# Patient Record
Sex: FEMALE | Race: WHITE | Employment: OTHER | URBAN - METROPOLITAN AREA
[De-identification: names, ages, dates, MRNs, and addresses within clinical notes are randomized per-mention and may not be internally consistent; named-entity substitution may affect disease eponyms.]

---

## 2020-07-29 ENCOUNTER — HOSPITAL ENCOUNTER (OUTPATIENT)
Dept: PHYSICAL THERAPY | Facility: CLINIC | Age: 65
Setting detail: THERAPIES SERIES
End: 2020-07-29
Attending: FAMILY MEDICINE
Payer: MEDICARE

## 2020-07-29 PROCEDURE — 97116 GAIT TRAINING THERAPY: CPT | Mod: GP | Performed by: PHYSICAL THERAPIST

## 2020-07-29 PROCEDURE — 97112 NEUROMUSCULAR REEDUCATION: CPT | Mod: GP | Performed by: PHYSICAL THERAPIST

## 2020-07-29 PROCEDURE — 97161 PT EVAL LOW COMPLEX 20 MIN: CPT | Mod: GP | Performed by: PHYSICAL THERAPIST

## 2020-07-29 NOTE — PROGRESS NOTES
[facility]        OUTPATIENT PHYSICAL THERAPY FUNCTIONAL EVALUATION  PLAN OF TREATMENT FOR OUTPATIENT REHABILITATION  (COMPLETE FOR INITIAL CLAIMS ONLY)  Patient's Last Name, First Name, M.I.  YOB: 1955  Maru Mock     Provider's Name   [facility]   Medical Record No.  [id number]     Start of Care Date:  07/29/20   Onset Date:  05/15/20   Type:     _X__PT   ____OT  ____SLP Medical Diagnosis:  Left leg weakness     PT Diagnosis:  LE weakness, impaired balance, impaired gait Visits from SOC:  1                              __________________________________________________________________________________  Plan of Treatment/Functional Goals:  balance training, neuromuscular re-education, ROM, strengthening, stretching, transfer training           GOALS  Walking outside on uneven surfaces  Maru will tolerate walking 1 mile outside with use of walking poles for aerobica activity on uneven surface of grass at her cabin.  10/27/20    Car transfer  Maru will demonstrate safe sequence and then ability to perform a car transfer in her 's SUV without physical assist of 1.  10/27/20    Falls  Maru will report no falls in a one month time frame and verbalize 3 strategies for mobility to reduce her risk.  10/27/20                                                           Therapy Frequency:  1 time/week   Predicted Duration of Therapy Intervention:  90 days    Khadijah Dunlap PT                                    I CERTIFY THE NEED FOR THESE SERVICES FURNISHED UNDER        THIS PLAN OF TREATMENT AND WHILE UNDER MY CARE .             Physician Signature               Date    X_____________________________________________________                      Certification Date From:  07/29/20   Certification Date To:  10/27/20    Referring Provider:  Mary Talbot,  MD    Initial Assessment  See Epic Evaluation- Start of Care Date: 07/29/20

## 2020-07-29 NOTE — PROGRESS NOTES
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   Patient role/Employment history Retired   Living environment House/townhome   Home/Community Accessibility Comments 2 story with , stairs with definite use of railing, 26 year old son lives with them   Current Assistive Devices Front Wheeled Walker;Standard Cane   Fall Risk Screen   Fall screen completed by PT   Have you fallen 2 or more times in the past year? Yes   Have you fallen and had an injury in the past year? Yes   Timed Up and Go score (seconds) 11.8   Is patient a fall risk? Yes   Pain   Patient currently in pain Denies   Vitals Signs   Vital Signs Comments Monitors BP at home with it running a little high   Cognitive Status Examination   Orientation orientation to person, place and time   Level of Consciousness alert   Follows Commands and Answers Questions 100% of the time;able to follow multistep instructions   Personal Safety and Judgment intact   Posture   Posture Normal   Posture Comments Looks down at her feet during gait   Range of Motion (ROM)   ROM Comment Left shoulder flexion to 160   Strength   Strength Comments Left hip flexion=4+/5, left knee flexion/extension=4/5, left ankle DF=4/5   Bed Mobility   Bed Mobility Comments Independent   Transfer Skills   Transfer Comments UE used  for safety   Gait   Gait Comments Gait with wider base and short step length with limited push off through toes.   Gait Special Tests   Gait Special Tests 25 FOOT TIMED WALK;FUNCTIONAL GAIT ASSESSMENT   Gait Special Tests 25 Foot Timed Walk   Seconds 8.35   Steps 16 Steps   Gait Special Tests Functional Gait Assessment Score out of 30   Score out of 30 20   Balance   Balance other (describe)   Balance Special Tests   Balance Special Tests Sit to stand reps   Balance Special Tests Sit to Stand Reps in 30 Seconds   Reps in 30 seconds 10   Height 18   Sensory Examination   Sensory Perception no deficits were identified   Sensory Perception Comments Reports full recovery   Planned Therapy Interventions   Planned Therapy Interventions balance training;neuromuscular re-education;ROM;strengthening;stretching;transfer training   Clinical Impression   Criteria for Skilled Therapeutic Interventions Met yes, treatment indicated   PT Diagnosis LE weakness, impaired balance, impaired gait   Influenced by the following impairments LE weakness, impaired balance, fear of falling, guarded posture   Functional limitations due to impairments Requires UE assist when walking outside, requires assistance getting into 's SUV, unable to carry laundry on stairs   Clinical Presentation Stable/Uncomplicated   Clinical Presentation Rationale Clinical judgement, symptom report   Clinical Decision Making (Complexity) Low complexity   Therapy Frequency 1 time/week   Predicted Duration of Therapy Intervention (days/wks) 90 days   Risk & Benefits of therapy have been explained Yes   Patient, Family & other staff in agreement with plan of care Yes   Clinical Impression Comments Maru presents with residual left sided weakness from previous CVA, right hip weakness s/p pinning and bilateral foot surgery with pinning through 1st toes impacting balance and mobility.  She will benefit from skilled PT to address stregnthening, balance and safety  set up to reduce risk for falls.   GOALS   PT Eval Goals 1;2;3   Goal 1   Goal Identifier Walking outside on uneven surfaces   Goal Description Maru will tolerate walking 1 mile outside with use of walking poles for aerobica activity on uneven surface of grass at her cabin.   Target Date 10/27/20   Goal 2   Goal Identifier Car transfer   Goal Description Maru will demonstrate safe sequence and then ability to perform a car transfer in her 's SUV without physical assist of 1.   Target Date 10/27/20   Goal 3   Goal Identifier Falls   Goal Description Maru will report no falls in a one month time frame and verbalize 3 strategies for mobility to reduce her risk.   Target Date 10/27/20   Total Evaluation Time   PT Eval, Low Complexity Minutes (98853) 30   Therapy Certification   Certification date from 07/29/20   Certification date to 10/27/20   Medical Diagnosis Left leg weakness   Certification I certify the need for these services furnished under this plan of treatment and while under my care.  (Physician co-signature of this document indicates review and certification of the therapy plan).

## 2020-08-05 ENCOUNTER — HOSPITAL ENCOUNTER (OUTPATIENT)
Dept: PHYSICAL THERAPY | Facility: CLINIC | Age: 65
Setting detail: THERAPIES SERIES
End: 2020-08-05
Attending: FAMILY MEDICINE
Payer: MEDICARE

## 2020-08-05 PROCEDURE — 97112 NEUROMUSCULAR REEDUCATION: CPT | Mod: GP | Performed by: PHYSICAL THERAPIST

## 2020-08-05 PROCEDURE — 97110 THERAPEUTIC EXERCISES: CPT | Mod: GP | Performed by: PHYSICAL THERAPIST

## 2020-08-05 PROCEDURE — 97116 GAIT TRAINING THERAPY: CPT | Mod: GP | Performed by: PHYSICAL THERAPIST

## 2020-08-13 ENCOUNTER — HOSPITAL ENCOUNTER (OUTPATIENT)
Dept: PHYSICAL THERAPY | Facility: CLINIC | Age: 65
Setting detail: THERAPIES SERIES
End: 2020-08-13
Attending: FAMILY MEDICINE
Payer: MEDICARE

## 2020-08-13 PROCEDURE — 97110 THERAPEUTIC EXERCISES: CPT | Mod: GP | Performed by: PHYSICAL THERAPIST

## 2020-08-13 PROCEDURE — 97140 MANUAL THERAPY 1/> REGIONS: CPT | Mod: GP | Performed by: PHYSICAL THERAPIST

## 2020-08-13 PROCEDURE — 97116 GAIT TRAINING THERAPY: CPT | Mod: GP | Performed by: PHYSICAL THERAPIST

## 2020-08-19 ENCOUNTER — HOSPITAL ENCOUNTER (OUTPATIENT)
Dept: PHYSICAL THERAPY | Facility: CLINIC | Age: 65
Setting detail: THERAPIES SERIES
End: 2020-08-19
Attending: FAMILY MEDICINE
Payer: MEDICARE

## 2020-08-19 PROCEDURE — 97116 GAIT TRAINING THERAPY: CPT | Mod: GP | Performed by: PHYSICAL THERAPIST

## 2020-08-19 PROCEDURE — 97140 MANUAL THERAPY 1/> REGIONS: CPT | Mod: GP | Performed by: PHYSICAL THERAPIST

## 2020-08-19 PROCEDURE — 97110 THERAPEUTIC EXERCISES: CPT | Mod: GP | Performed by: PHYSICAL THERAPIST

## 2020-08-26 ENCOUNTER — HOSPITAL ENCOUNTER (OUTPATIENT)
Dept: PHYSICAL THERAPY | Facility: CLINIC | Age: 65
Setting detail: THERAPIES SERIES
End: 2020-08-26
Attending: FAMILY MEDICINE
Payer: MEDICARE

## 2020-08-26 PROCEDURE — 97116 GAIT TRAINING THERAPY: CPT | Mod: GP | Performed by: PHYSICAL THERAPIST

## 2020-08-26 PROCEDURE — 97110 THERAPEUTIC EXERCISES: CPT | Mod: GP | Performed by: PHYSICAL THERAPIST

## 2020-09-02 ENCOUNTER — HOSPITAL ENCOUNTER (OUTPATIENT)
Dept: PHYSICAL THERAPY | Facility: CLINIC | Age: 65
Setting detail: THERAPIES SERIES
End: 2020-09-02
Attending: FAMILY MEDICINE
Payer: MEDICARE

## 2020-09-02 PROCEDURE — 97116 GAIT TRAINING THERAPY: CPT | Mod: GP | Performed by: PHYSICAL THERAPIST

## 2020-09-02 PROCEDURE — 97110 THERAPEUTIC EXERCISES: CPT | Mod: GP | Performed by: PHYSICAL THERAPIST

## 2020-09-09 ENCOUNTER — HOSPITAL ENCOUNTER (OUTPATIENT)
Dept: PHYSICAL THERAPY | Facility: CLINIC | Age: 65
Setting detail: THERAPIES SERIES
End: 2020-09-09
Attending: FAMILY MEDICINE
Payer: MEDICARE

## 2020-09-09 PROCEDURE — 97112 NEUROMUSCULAR REEDUCATION: CPT | Mod: GP | Performed by: PHYSICAL THERAPIST

## 2020-09-09 PROCEDURE — 97116 GAIT TRAINING THERAPY: CPT | Mod: GP | Performed by: PHYSICAL THERAPIST

## 2020-09-09 PROCEDURE — 97110 THERAPEUTIC EXERCISES: CPT | Mod: GP | Performed by: PHYSICAL THERAPIST

## 2020-09-30 ENCOUNTER — HOSPITAL ENCOUNTER (OUTPATIENT)
Dept: PHYSICAL THERAPY | Facility: CLINIC | Age: 65
Setting detail: THERAPIES SERIES
End: 2020-09-30
Attending: FAMILY MEDICINE
Payer: MEDICARE

## 2020-09-30 PROCEDURE — 97112 NEUROMUSCULAR REEDUCATION: CPT | Mod: GP | Performed by: PHYSICAL THERAPIST

## 2020-09-30 PROCEDURE — 97116 GAIT TRAINING THERAPY: CPT | Mod: GP | Performed by: PHYSICAL THERAPIST

## 2020-11-03 ENCOUNTER — HOSPITAL ENCOUNTER (OUTPATIENT)
Dept: PHYSICAL THERAPY | Facility: CLINIC | Age: 65
Setting detail: THERAPIES SERIES
End: 2020-11-03
Attending: FAMILY MEDICINE
Payer: MEDICARE

## 2020-11-03 PROCEDURE — 97116 GAIT TRAINING THERAPY: CPT | Mod: GP | Performed by: PHYSICAL THERAPIST

## 2020-11-03 PROCEDURE — 97112 NEUROMUSCULAR REEDUCATION: CPT | Mod: GP | Performed by: PHYSICAL THERAPIST

## 2020-11-06 NOTE — PROGRESS NOTES
[facility]      OUTPATIENT PHYSICAL THERAPY  PLAN OF TREATMENT FOR OUTPATIENT REHABILITATION    Patient's Last Name, First Name, M.I.                YOB: 1955  Maru Mock                        Provider's Name  [facility] Medical Record No.  [id number]                               Onset Date: 5/15/2020   Start of Care Date: 7/29/2020   Type:     _X_PT   ___OT   ___SLP Medical Diagnosis: Left leg weakness                       PT Diagnosis: LE weakness, impaired balance, impaired gait      _________________________________________________________________________________  Plan of Treatment:    Frequency/Duration: 1x every 2 weeks for 90 days     Goals:  Goal Identifier Walking outside on uneven surfaces   Goal Description Maru will tolerate walking 1 mile outside with use of walking poles for aerobica activity on uneven surface of grass at her cabin.   Target Date 11/27/20   Date Met      Progress:This goal was extended due to not walking outside for the past month due to increased fatigue.       Goal Identifier Car transfer   Goal Description Maru will demonstrate safe sequence and then ability to perform a car transfer in her 's SUV without physical assist of 1.   Target Date 10/27/20   Date Met  11/03/20   Progress:She reports this is easier and feels it is safer sequence.     Goal Identifier Falls   Goal Description Maru will report no falls in a one month time frame and verbalize 3 strategies for mobility to reduce her risk.   Target Date 10/27/20   Date Met  11/03/20   Progress:Reprots no falls with ability to verbalize 3 strategies.     Goal Identifier NEW GOAL- vaccuum   Goal Description Maru will be able to perform houhold tasks, such as vaccuuming, for 30 minutes reporting tolerable fatigue and no loss of balance.   Target Date 01/25/21   Date Met       Progress:         Certification date from 10/28/2020 to 1/25/2021.    Khadijah Dunlap PT          I CERTIFY THE NEED FOR THESE SERVICES FURNISHED UNDER        THIS PLAN OF TREATMENT AND WHILE UNDER MY CARE .             Physician Signature               Date    X_____________________________________________________                      Referring Provider: Mary Talbot MD

## 2020-11-06 NOTE — PROGRESS NOTES
Outpatient Physical Therapy Progress Note     Patient: Maru CASTELLANOS Emili  : 1955    Beginning/End Dates of Reporting Period:  2020 to 2020 Seen for 9 visits    Referring Provider: Mary Talbot MD    Therapy Diagnosis: LE weakness, impaired balance, impaired gait     Client Self Report: Her leg cramps have been bad lately.  She reports motivation for mobility has been low with more fatigue, but reports no falls.    Objective Measurements:  Objective Measure: Sit<>stand  Details: 11 reps in 30 seconds  Objective Measure: 25 Foot Timed Walk  Details: 15 steps in 8.13  Objective Measure: TUG  Details: 12.28    Goals:  Goal Identifier Walking outside on uneven surfaces   Goal Description Maru will tolerate walking 1 mile outside with use of walking poles for aerobica activity on uneven surface of grass at her cabin.   Target Date 20   Date Met      Progress:This goal was extended due to not walking outside for the past month due to increased fatigue.       Goal Identifier Car transfer   Goal Description Maru will demonstrate safe sequence and then ability to perform a car transfer in her 's SUV without physical assist of 1.   Target Date 10/27/20   Date Met  20   Progress:She reports this is easier and feels it is safer sequence.     Goal Identifier Falls   Goal Description Maru will report no falls in a one month time frame and verbalize 3 strategies for mobility to reduce her risk.   Target Date 10/27/20   Date Met  20   Progress:Reprots no falls with ability to verbalize 3 strategies.     Goal Identifier NEW GOAL- vaccuum   Goal Description Maru will be able to perform houhold tasks, such as vaccuuming, for 30 minutes reporting tolerable fatigue and no loss of balance.   Target Date 21   Date Met      Progress:     Progress Toward Goals:   Progress this reporting period: Good progress despite reporting she has been less active over the past month due to  increased fatigue from depressive episode.  She is motivated to reinstate her HEP and begin walking for aerobic activity.  She will benefit from ongoing skilled PT to continue to improve balance and walking mechanics.    Plan:  Continue therapy per current plan of care.    Discharge:  No

## 2020-11-18 ENCOUNTER — HOSPITAL ENCOUNTER (OUTPATIENT)
Dept: PHYSICAL THERAPY | Facility: CLINIC | Age: 65
Setting detail: THERAPIES SERIES
End: 2020-11-18
Attending: FAMILY MEDICINE
Payer: MEDICARE

## 2020-11-18 PROCEDURE — 97116 GAIT TRAINING THERAPY: CPT | Mod: GP | Performed by: PHYSICAL THERAPIST

## 2020-11-18 PROCEDURE — 97112 NEUROMUSCULAR REEDUCATION: CPT | Mod: GP | Performed by: PHYSICAL THERAPIST

## 2020-12-15 ENCOUNTER — HOSPITAL ENCOUNTER (OUTPATIENT)
Dept: PHYSICAL THERAPY | Facility: CLINIC | Age: 65
Setting detail: THERAPIES SERIES
End: 2020-12-15
Attending: FAMILY MEDICINE
Payer: MEDICARE

## 2020-12-15 PROCEDURE — 97116 GAIT TRAINING THERAPY: CPT | Mod: GP | Performed by: PHYSICAL THERAPIST

## 2020-12-15 PROCEDURE — 97112 NEUROMUSCULAR REEDUCATION: CPT | Mod: GP | Performed by: PHYSICAL THERAPIST

## 2021-01-19 ENCOUNTER — HOSPITAL ENCOUNTER (OUTPATIENT)
Dept: PHYSICAL THERAPY | Facility: CLINIC | Age: 66
Setting detail: THERAPIES SERIES
End: 2021-01-19
Attending: FAMILY MEDICINE
Payer: MEDICARE

## 2021-01-19 PROCEDURE — 97112 NEUROMUSCULAR REEDUCATION: CPT | Mod: GP | Performed by: PHYSICAL THERAPIST

## 2021-01-19 PROCEDURE — 97116 GAIT TRAINING THERAPY: CPT | Mod: GP | Performed by: PHYSICAL THERAPIST

## 2021-01-19 NOTE — PROGRESS NOTES
[facility]      OUTPATIENT PHYSICAL THERAPY  PLAN OF TREATMENT FOR OUTPATIENT REHABILITATION    Patient's Last Name, First Name, M.I.                YOB: 1955  Maru Mock                        Provider's Name  [facility] Medical Record No.  [id number]                               Onset Date: 5/15/20   Start of Care Date: 7/29/2020   Type:     _X_PT   ___OT   ___SLP Medical Diagnosis: Left Leg Weakness                       PT Diagnosis:  LE weakness, impaired balance, impaired gait      _________________________________________________________________________________  Plan of Treatment:    Frequency/Duration: 1x month for 90 days    Objective Measurements:  Objective Measure: Sit<>stand  Details: 11 reps in 30 seconds  Objective Measure: 25 Foot Timed Walk  Details: 15 steps in 8.13  Objective Measure: TUG  Details: 12.28     Goals:  Goal Identifier Walking outside on uneven surfaces   Goal Description Maru will tolerate walking 1 mile outside with use of walking poles for aerobica activity on uneven surface of grass at her cabin.   Target Date 11/27/20   Date Met      Progress:This goal was extended due to not walking outside for the past month due to increased fatigue.        Goal Identifier Car transfer   Goal Description Maru will demonstrate safe sequence and then ability to perform a car transfer in her 's SUV without physical assist of 1.   Target Date 10/27/20   Date Met  11/03/20   Progress:She reports this is easier and feels it is safer sequence.      Goal Identifier Falls   Goal Description Maru will report no falls in a one month time frame and verbalize 3 strategies for mobility to reduce her risk.   Target Date 10/27/20   Date Met  11/03/20   Progress:Reprots no falls with ability to verbalize 3 strategies.      Goal Identifier NEW GOAL-  vaccuum   Goal Description Maru will be able to perform houhold tasks, such as vaccuuming, for 30 minutes reporting tolerable fatigue and no loss of balance.   Target Date 01/25/21   Date Met      Progress:      Progress Toward Goals:   Progress this reporting period: Good progress despite reporting she has been less active over the past month due to increased fatigue from depressive episode.  She is motivated to reinstate her HEP and begin walking for aerobic activity.  She will benefit from ongoing skilled PT to continue to improve balance and walking mechanics.     Physician Signature                                     Date     X_____________________________________________________                                 Certification date from 1/26/2021 to 4/25/2021.    Khadijah Dunlap, PT          I CERTIFY THE NEED FOR THESE SERVICES FURNISHED UNDER        THIS PLAN OF TREATMENT AND WHILE UNDER MY CARE     (Physician co-signature of this document indicates review and certification of the therapy plan).                Referring Provider: Mary Talbot MD

## 2021-01-19 NOTE — PROGRESS NOTES
Outpatient Physical Therapy Progress Note     Patient: Maru CASTELLANOS Emili  : 1955    Beginning/End Dates of Reporting Period:  2020 to 2021    Referring Provider: Mary Talbot DO    Therapy Diagnosis:  LE weakness, impaired balance, impaired gait          Client Self Report: Cramping is not as bad and remains intermittent.  No falls and has been doing the exs consistently, but is not walking yet at a community center. She has not vaccuumed as of late, but says it is going better.  Continuing to have a hard time gettign on and off the floor, but will do it every once in a while.  She is not longer climbing on a step stool for safety.    Objective Measurements:  Objective Measure: 25 Foot Timed Walk  Details: 14 steps in 7.6 seconds  Objective Measure: TUG  Details: 11.6 seconds          Goals:  Goal Identifier Walking outside on uneven surfaces   Goal Description Maru will tolerate walking 1 mile outside with use of walking poles for aerobica activity on uneven surface of grass at her cabin.   Target Date 20   Date Met      Progress: She has not been walking outside due to ice.  She is working on walking at nearby [place] [facility].  She has been walking more at stores but uses a cart.     Goal Identifier Car transfer   Goal Description Maru will demonstrate safe sequence and then ability to perform a car transfer in her 's SUV without physical assist of 1.   Target Date 10/27/20   Date Met  20   Progress:     Goal Identifier Falls   Goal Description Maru will report no falls in a one month time frame and verbalize 3 strategies for mobility to reduce her risk.   Target Date 10/27/20   Date Met  20   Progress:     Goal Identifier NEW GOAL- vaccuum   Goal Description Maru will be able to perform houhold tasks, such as vaccuuming, for 30 minutes reporting tolerable fatigue and no loss of balance.   Target Date 21   Date Met      Progress:In progress- She has not  performed consistently but was able to do for 15 minutes using a light weight vacuum.          Progress Toward Goals:   Progress this reporting period: Progress towards and is demonstrating improved safety and sequence with gait and balance activities.  She will continue to benefit from skilled PT on a monthly basis.    Plan:  Continue therapy per current plan of care.    Discharge:  No

## 2021-02-16 ENCOUNTER — HOSPITAL ENCOUNTER (OUTPATIENT)
Dept: PHYSICAL THERAPY | Facility: CLINIC | Age: 66
Setting detail: THERAPIES SERIES
End: 2021-02-16
Attending: FAMILY MEDICINE
Payer: MEDICARE

## 2021-02-16 PROCEDURE — 97116 GAIT TRAINING THERAPY: CPT | Mod: GP | Performed by: PHYSICAL THERAPIST

## 2021-02-16 PROCEDURE — 97110 THERAPEUTIC EXERCISES: CPT | Mod: GP | Performed by: PHYSICAL THERAPIST

## 2021-03-16 ENCOUNTER — HOSPITAL ENCOUNTER (OUTPATIENT)
Dept: PHYSICAL THERAPY | Facility: CLINIC | Age: 66
Setting detail: THERAPIES SERIES
End: 2021-03-16
Attending: FAMILY MEDICINE
Payer: MEDICARE

## 2021-03-16 PROCEDURE — 97110 THERAPEUTIC EXERCISES: CPT | Mod: GP | Performed by: PHYSICAL THERAPIST

## 2021-03-16 PROCEDURE — 97116 GAIT TRAINING THERAPY: CPT | Mod: GP | Performed by: PHYSICAL THERAPIST

## 2021-03-16 PROCEDURE — 97112 NEUROMUSCULAR REEDUCATION: CPT | Mod: GP | Performed by: PHYSICAL THERAPIST

## 2021-04-13 ENCOUNTER — HOSPITAL ENCOUNTER (OUTPATIENT)
Dept: PHYSICAL THERAPY | Facility: CLINIC | Age: 66
Setting detail: THERAPIES SERIES
End: 2021-04-13
Attending: FAMILY MEDICINE
Payer: MEDICARE

## 2021-04-13 PROCEDURE — 97116 GAIT TRAINING THERAPY: CPT | Mod: GP | Performed by: PHYSICAL THERAPIST

## 2021-04-13 PROCEDURE — 97112 NEUROMUSCULAR REEDUCATION: CPT | Mod: GP | Performed by: PHYSICAL THERAPIST

## 2021-04-16 NOTE — PROGRESS NOTES
Outpatient Physical Therapy Discharge Note     Patient: Maru CASTELLANOS Emili  : 1955    Beginning/End Dates of Reporting Period:  2020 to 2021 Seen for a total of 15 visits    Referring Provider: Mary Talbot, DO    Therapy Diagnosis: LE weakness, impaired balance, impaired gait     Client Self Report: Maru brings her  with to the final appointment to help with carry over at home.  She reports sometimes she is just not motivated to do the HEP at home and feels her balance when walking is getting a little worse as a result.  Her  agrees.  He reports she was doing much better for a while, but now he has noticed she has returned to some of her old habits.    Objective Measurements:   Able to demonstrate safe sequence with sit<>stand  Able to perform safe gait mechanics with cueing      Goals:  Goal Identifier Walking outside on uneven surfaces   Goal Description Maru will tolerate walking 1 mile outside with use of walking poles for aerobica activity on uneven surface of grass at her cabin.   Target Date 20   Date Met  21   Progress:     Goal Identifier Car transfer   Goal Description Maru will demonstrate safe sequence and then ability to perform a car transfer in her 's SUV without physical assist of 1.   Target Date 10/27/20   Date Met  20   Progress:     Goal Identifier Falls   Goal Description Maru will report no falls in a one month time frame and verbalize 3 strategies for mobility to reduce her risk.   Target Date 10/27/20   Date Met  20   Progress:     Goal Identifier NEW GOAL- vaccuum   Goal Description Maru will be able to perform houhold tasks, such as vaccuuming, for 30 minutes reporting tolerable fatigue and no loss of balance.   Target Date 21   Date Met  21   Progress:     Progress Toward Goals:   Progress this reporting period: Goals were met with assistance of  for carry over to increase consistency.  She will  benefit from home program and walking for aerobic activity.    Plan:  Discharge from therapy.    Discharge:    Reason for Discharge: Patient has met all goals.    Equipment Issued: Discussed walking pole, but patient and  do not want one at this time.    Discharge Plan: Patient to continue home program.